# Patient Record
Sex: FEMALE | NOT HISPANIC OR LATINO
[De-identification: names, ages, dates, MRNs, and addresses within clinical notes are randomized per-mention and may not be internally consistent; named-entity substitution may affect disease eponyms.]

---
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
## 2019-06-15 ENCOUNTER — WALK IN (OUTPATIENT)
Dept: URGENT CARE | Age: 19
End: 2019-06-15

## 2019-06-15 DIAGNOSIS — J02.9 PHARYNGITIS, UNSPECIFIED ETIOLOGY: Primary | ICD-10-CM

## 2019-06-15 DIAGNOSIS — J30.2 SEASONAL ALLERGIES: ICD-10-CM

## 2019-06-15 LAB
INTERNAL PROCEDURAL CONTROLS ACCEPTABLE: YES
S PYO AG THROAT QL IA.RAPID: NEGATIVE

## 2019-06-15 PROCEDURE — 87147 CULTURE TYPE IMMUNOLOGIC: CPT | Performed by: NURSE PRACTITIONER

## 2019-06-15 PROCEDURE — 87880 STREP A ASSAY W/OPTIC: CPT | Performed by: NURSE PRACTITIONER

## 2019-06-15 PROCEDURE — 99213 OFFICE O/P EST LOW 20 MIN: CPT | Performed by: NURSE PRACTITIONER

## 2019-06-15 PROCEDURE — 87070 CULTURE OTHR SPECIMN AEROBIC: CPT | Performed by: NURSE PRACTITIONER

## 2019-06-15 RX ORDER — TOPIRAMATE 100 MG/1
100 TABLET, FILM COATED ORAL
COMMUNITY

## 2019-06-15 RX ORDER — ALBUTEROL SULFATE 90 UG/1
AEROSOL, METERED RESPIRATORY (INHALATION)
COMMUNITY

## 2019-06-15 RX ORDER — ALBUTEROL SULFATE 2.5 MG/3ML
SOLUTION RESPIRATORY (INHALATION)
COMMUNITY

## 2019-06-15 ASSESSMENT — ENCOUNTER SYMPTOMS
GASTROINTESTINAL NEGATIVE: 1
APPETITE CHANGE: 0
TROUBLE SWALLOWING: 0
NEUROLOGICAL NEGATIVE: 1
ACTIVITY CHANGE: 0
FEVER: 0
FATIGUE: 0
CHILLS: 1
RHINORRHEA: 0
RESPIRATORY NEGATIVE: 1
DIAPHORESIS: 0
EYES NEGATIVE: 1
SINUS PAIN: 0
ADENOPATHY: 1
SINUS PRESSURE: 0
VOICE CHANGE: 1
SORE THROAT: 1

## 2019-06-15 ASSESSMENT — PAIN SCALES - GENERAL: PAINLEVEL: 7-8

## 2019-06-17 LAB
BACTERIA SPEC RESP CULT: ABNORMAL
REPORT STATUS (RPT): ABNORMAL
SPECIMEN SOURCE: ABNORMAL

## 2019-06-17 RX ORDER — PENICILLIN V POTASSIUM 500 MG/1
500 TABLET ORAL 2 TIMES DAILY
Qty: 20 TABLET | Refills: 0 | Status: SHIPPED | OUTPATIENT
Start: 2019-06-17 | End: 2019-06-27

## 2021-05-26 VITALS
BODY MASS INDEX: 51.38 KG/M2 | DIASTOLIC BLOOD PRESSURE: 82 MMHG | SYSTOLIC BLOOD PRESSURE: 122 MMHG | WEIGHT: 290 LBS | RESPIRATION RATE: 20 BRPM | HEART RATE: 76 BPM | HEIGHT: 63 IN | TEMPERATURE: 97.6 F | OXYGEN SATURATION: 98 %

## 2022-05-24 ENCOUNTER — APPOINTMENT (OUTPATIENT)
Dept: OTHER | Facility: HOSPITAL | Age: 22
End: 2022-05-24
Attending: PREVENTIVE MEDICINE

## 2022-05-26 ENCOUNTER — APPOINTMENT (OUTPATIENT)
Dept: OTHER | Facility: HOSPITAL | Age: 22
End: 2022-05-26
Attending: PREVENTIVE MEDICINE

## 2022-05-27 ENCOUNTER — TELEPHONE (OUTPATIENT)
Dept: SCHEDULING | Age: 22
End: 2022-05-27

## 2022-05-28 ENCOUNTER — OFFICE VISIT (OUTPATIENT)
Dept: URGENT CARE | Age: 22
End: 2022-05-28

## 2022-05-28 VITALS
TEMPERATURE: 98.2 F | BODY MASS INDEX: 51.67 KG/M2 | RESPIRATION RATE: 18 BRPM | OXYGEN SATURATION: 98 % | DIASTOLIC BLOOD PRESSURE: 70 MMHG | SYSTOLIC BLOOD PRESSURE: 116 MMHG | WEIGHT: 280.76 LBS | HEART RATE: 88 BPM | HEIGHT: 62 IN

## 2022-05-28 DIAGNOSIS — Z23 NEED FOR VACCINATION: ICD-10-CM

## 2022-05-28 DIAGNOSIS — Z00.00 WELLNESS EXAMINATION: ICD-10-CM

## 2022-05-28 DIAGNOSIS — Z02.1 PRE-EMPLOYMENT EXAMINATION: Primary | ICD-10-CM

## 2022-05-28 PROCEDURE — 99211 OFF/OP EST MAY X REQ PHY/QHP: CPT | Performed by: NURSE PRACTITIONER

## 2022-05-28 PROCEDURE — X0945 SELF PAY APN OR PA PERFORMED ADMINISTRATIVE PHYSICAL: HCPCS | Performed by: NURSE PRACTITIONER

## 2022-05-28 PROCEDURE — 90715 TDAP VACCINE 7 YRS/> IM: CPT | Performed by: NURSE PRACTITIONER

## 2022-05-28 RX ORDER — ALBUTEROL SULFATE 90 UG/1
2 AEROSOL, METERED RESPIRATORY (INHALATION)
COMMUNITY
Start: 2022-04-25

## 2022-05-28 RX ORDER — DEXTROAMPHETAMINE SACCHARATE, AMPHETAMINE ASPARTATE MONOHYDRATE, DEXTROAMPHETAMINE SULFATE AND AMPHETAMINE SULFATE 7.5; 7.5; 7.5; 7.5 MG/1; MG/1; MG/1; MG/1
30 CAPSULE, EXTENDED RELEASE ORAL EVERY MORNING
COMMUNITY
Start: 2022-04-29

## 2022-05-28 ASSESSMENT — ENCOUNTER SYMPTOMS
EYES NEGATIVE: 1
ENDOCRINE NEGATIVE: 1
NEUROLOGICAL NEGATIVE: 1
HEMATOLOGIC/LYMPHATIC NEGATIVE: 1
GASTROINTESTINAL NEGATIVE: 1
PSYCHIATRIC NEGATIVE: 1
ALLERGIC/IMMUNOLOGIC NEGATIVE: 1
CONSTITUTIONAL NEGATIVE: 1
RESPIRATORY NEGATIVE: 1

## 2022-06-01 ENCOUNTER — TELEPHONE (OUTPATIENT)
Dept: SCHEDULING | Age: 22
End: 2022-06-01

## 2022-09-29 ENCOUNTER — APPOINTMENT (OUTPATIENT)
Dept: URGENT CARE | Age: 22
End: 2022-09-29

## 2025-05-15 NOTE — PROGRESS NOTES
Outpatient Maternal-Fetal Medicine Consultation    Dear Dr. Max,    Thank you for requesting ultrasound evaluation and maternal fetal medicine consultation on your patient Ela Kinney.  As you are aware she is a 24 year old female with a Tang pregnancy at 20w0d.  A maternal-fetal medicine consultation was requested secondary to class III obesity.  Her prenatal records and labs were reviewed.    Prior to this pregnancy she has been successful with weight loss.  She has been on Mounjaro from 2024 till the first week of 2025 when she figured out she was pregnant.  Since stopping the Mounjaro she reports she has gained 14 pounds.    She takes Adderall extended release 30 mg daily for ADHD.  She has been on this since she was in the second grade.  In the past she has tried to go down to 15 mg a day but did find that her symptoms were not well-controlled and she had increased weight gain due to her increased appetite.    We discussed as with any medication, there are risks and benefits that need to be determined.  My usual recommendation is to be on the lowest dose needed to control symptoms.  She is going to discuss with her primary care provider whether there is a dose between 15 mg and 30 mg that she can try.      HISTORY  OB History    Para Term  AB Living   1 0 0 0 0 0   SAB IAB Ectopic Multiple Live Births   0 0 0 0 0     # 1 - Date: None, Sex: None, Weight: None, GA: None, Type: None, Apgar1: None, Apgar5: None, Living: None, Birth Comments: None    Past Medical History  The patient  has a past medical history of ADHD and Morbid obesity with BMI of 45.0-49.9, adult ([facility]).    Past Surgical History  The patient  has no past surgical history on file.    Family History  The patient has no family status information on file.       Medications: Medications - Current[1]  Allergies: Allergies[2]      PHYSICAL EXAMINATION:  /82 (BP Location: Right arm, Patient  Position: Sitting, Cuff Size: large)   Pulse 112   Ht 5' 2\" (1.575 m)   Wt 264 lb (119.7 kg)   LMP 2024   BMI 48.29 kg/m²   General: alert and oriented,no acute distress; obese female  Abdomen: gravid, soft, non-tender  Extremities: non-tender, no edema      OBSTETRIC ULTRASOUND  The patient had a level 2 ultrasound today which I interpreted the results and reviewed them with the patient.    Ultrasound Findings:  Single IUP in breech presentation.    Placenta is posterior.   A 3 vessel cord is noted.  Cardiac activity is present at 135 bpm   g ( 0 lb 13 oz);   MVP is 4 cm .     The RVOT and t 4 chamber views appear normal but suboptimal due to fetal position and maternal habitus.    The fetal measurements are consistent with the established EDC. No ultrasound evidence of structural abnormalities are seen today. The nasal bone is present. No ultrasound evidence of markers for aneuploidy are seen. She understands that ultrasound exam cannot exclude genetic abnormalities and that genetic testing is recommended. The limitations of ultrasound were discussed.     Uterus and adnexa appeared normal  today on US    See imaging tab for the complete US report.    DISCUSSION  During her visit we discussed and reviewed the following issues:  OBESITY:  Her BMI prior to pregnancy was 48  Obesity during pregnancy is associated with numerous maternal and  risks.  It is not clear whether obesity is a direct cause of adverse pregnancy outcome or whether the association between obesity and adverse pregnancy outcome is due to factors such as diabetes mellitus.   Data suggest that obese women should be encouraged to undertake a weight reduction program (diet, exercise, behavior modification, and possibly bariatric surgery in some cases) prior to attempting to conceive.            Subfertility in obese women is most commonly related to ovulatory dysfunction, and, in some obese women, the ovulatory dysfunction is  related to polycystic ovary syndrome (PCOS). It is also important to note that even among ovulatory women, increasing obesity is associated with decreasing spontaneous pregnancy rates.  The increased risk of miscarriage in obese women may be because such women often have PCOS or isolated insulin resistance.                 Due to its strong association with obesity in the general population, type 2 diabetes mellitus is one of the two most common medical complications of the obese . The increased risk of type 2 diabetes is primarily related to an exaggerated increase in insulin resistance in the obese state. It is reasonable to screen obese gravidas for undiagnosed pregestational diabetes in the first trimester.   Glucose intolerance associated with gestational diabetes generally resolves postpartum; however, obese women with a history of gestational diabetes have a two-fold increased prevalence of subsequent type 2 diabetes.           An association between obesity and hypertensive disorders during pregnancy has been consistently reported.  In particular, maternal weight and BMI are independent risk factors for preeclampsia.             Studies have found that the increased risk of  birth in obese gravidas is primarily associated with obesity-related medical and  complications, rather than an intrinsic predisposition to spontaneous  birth. Prevention of  birth in these patients, therefore, should be directed toward prevention or management of medical and obstetrical complications.               Both prepregnancy obesity and excessive maternal weight gain before or during pregnancy contribute to an increased probability of  delivery.  It has also been hypothesized that obesity may lead to dystocia due to increased soft tissue deposition in the maternal pelvis.    delivery in the obese  is associated with numerous perioperative concerns, including emergency  delivery, prolonged incision to delivery interval, blood loss >1000 mL, longer operative times, wound infection, thromboembolism, and endometritis.            Maternal obesity appears to be associated with a small increase in the absolute rate of some congenital anomalies (primarily neural tube defect and cardiac), and the risk may increase with increasing maternal weight.  Level II ultrasound is advised for women with obesity.  The risk of neural tube defects increased significantly with maternal weight.    The analysis found that overweight and obese pregnant women experienced significantly more stillbirths than normal weight women.  Third trimester  testing is advised.    We discussed the current recommendations for limited gestational weight gain in pregnancy for overweight and obese women.  The [employer] of [employer] currently recommends that women keep gestational weight gain to between 8-18 lbs.  We discussed the role of mild to moderate exercise, healthy food choices and appropriate portions sized to help achieve this goal.  Excess weight gain is associated with higher rates of gestational diabetes, hypertensive complications, fetal macrosomia and delivery complications.  Women with weight loss or insufficient weight gain have higher rates of small for gestational age infants.    A recent study found that initiating moderate exercise in early pregnancy for obese gravidas significantly reduced the incidence of gestational diabetes, gestational hypertension,  deliveries and C-sections..  I encouraged Ela to continue and even increase moderate exercise such as walking or stationary bike in the pregnancy.    GLP1-agonists From UpToDate (3/2025)      Glucagon-like peptide-1 (GLP-1) receptor agonists are not recommended for patients with type 2 diabetes mellitus planning to become pregnant. Patients who could become pregnant should use effective contraception during therapy. Transition to a  preferred therapy should be initiated prior to conception and contraception should be continued until glycemic control is achieved (ADA 2024; Alexopoulos 2019; [place] 2020)    When used for the treatment of diabetes mellitus or weight loss management, semaglutide should be discontinued for >=2 months prior to becoming pregnant.    GLP-1 receptor agonists, including semaglutide, have been evaluated to manage metabolic aspects of polycystic ovary syndrome (PCOS), including decreasing weight and improving fertility (Goldberg 2024; Aaron 202). Until additional data are available, use in patients with PCOS to improve fertility is considered experimental (Teede 2018).    Pregnancy Considerations:     Outcome data following exposure to glucagon-like peptide-1 receptor agonists, including semaglutide, during pregnancy are limited (Cesta 202; Octavio 202; Prieto 202; Skov ).    Poorly controlled diabetes during pregnancy is associated with an increased risk of adverse maternal and fetal outcomes, including diabetic ketoacidosis, preeclampsia, spontaneous ,  delivery, delivery complications, major malformations, stillbirth, and macrosomia (ACOG 2018). To prevent adverse outcomes, prior to conception and throughout pregnancy, maternal blood glucose and HbA1c should be kept as close to target goals as possible but without causing significant hypoglycemia (ADA 2024).    Agents other than semaglutide are currently recommended to treat diabetes mellitus during pregnancy (ADA 2024).    An increased risk of adverse maternal and fetal events is associated with obesity; however, moderate gestational weight gain based on prepregnancy BMI is required for positive fetal outcomes in all pregnancies, including patients who are overweight or obese. Therefore, medications for weight loss therapy are not recommended during pregnancy (ACOG 2021; Greg 2020).    Data collection to monitor pregnancy and infant outcomes  following exposure to GLP-1 is ongoing. Health care providers are encouraged to enroll patients exposed to GLP-1 during pregnancy in the registry by contacting [email or web address]). Patients may also enroll themselves.    Adderall is a category C medication in pregnancy.  Adverse effects have been observed in animal reproduction studies. The majority of human data is based on illicit amphetamine/methamphetamine exposure and not from therapeutic maternal use (Pete, 2005). Use of amphetamines during pregnancy may lead to an increased risk of premature birth and low birth weight; newborns may experience symptoms of withdrawal. Behavioral problems may also occur later in childhood (Nohelia, 2012).  Adderall enters the breast milk and breastfeeding is not recommended for women taking this medication.      We discussed the recommended plan of care based on her  risk factors.  Ela and her mother had their questions answered to their satisfaction.      IMPRESSION:  IUP at 20w0d  Normal level 2 ultrasound but suboptimal views of the 4 chamber heart and RVOT, although the views do appear normal  Class III obesity complicating pregnancy  ADHD  Medication exposures    RECOMMENDATIONS:  Continue care with Dr. Max  Was advised to limit total weight gain to less than 20 pounds  Monthly growth ultrasounds starting at 28 weeks, add BPP to each growth ultrasound at 32 weeks and beyond  Weekly NSTs at 34 weeks  Delivery 39-40 weeks  Daily low-dose aspirin    Total time spent 60 minutes this calendar day which includes preparing to see the patient including chart review, obtaining and/or reviewing additional medical history, performing a physical exam and evaluation, documenting clinical information in the electronic medical record, independently interpreting results, counseling the patient, communicating results to the patient/family/caregiver and coordinating care.     Case discussed with  patient who demonstrated understanding and agreement with plan.     Thank you for allowing me to participate in the care of this patient.  Please feel free to contact me with any questions.    Anne Monique MD  Maternal-Fetal Medicine       Note to patient and family:  The 21st Century Cures Act makes medical notes available to patients in the interest of transparency.  However, please be advised that this is a medical document.  It is intended as a peer to peer communication.  It is written in medical language and may contain abbreviations or verbiage that are technical and unfamiliar.  It may appear blunt or direct.  Medical documents are intended to carry relevant information, facts as evident, and the clinical opinion of the practitioner.         [1]   Current Outpatient Medications:     prenatal vitamin with DHA 27-0.8-228 MG Oral Cap, Take 1 capsule by mouth daily., Disp: , Rfl:     amphetamine-dextroamphetamine 30 MG Oral Tab, Take by mouth daily., Disp: , Rfl:     aspirin 81 MG Oral Tab EC, Take 2 tablets (162 mg total) by mouth daily., Disp: , Rfl:   [2] Not on File

## 2025-07-11 NOTE — PROGRESS NOTES
Pt here for growth US, +FM. States she has some anxiety recently and increased HR because of it.

## 2025-07-11 NOTE — PROGRESS NOTES
Outpatient Maternal-Fetal Medicine Consultation    Dear Dr. Max,    Thank you for requesting ultrasound evaluation and maternal fetal medicine consultation on your patient Ela Kinney.  As you are aware she is a 24 year old female with a Tang pregnancy with Estimated Date of Delivery: 10/3/25 .  A maternal-fetal medicine follow up is today. Her prenatal records and labs were reviewed.    Patient is adopted.  She is here with her adoptive mother that she has lived with since she was 2 days old.  Her mother stated she has been on Adderall since second grade.  In addition there is a history of a pituitary adenoma that was seen on MRI as a child but then was never seen on follow-up imaging.    She is planning her 28-week labs this next week.    She is worried about her anxiety level.  She had a panic attack 3 weeks ago and even passed out during it.  She is having palpitations every few days.  It tends to happen more at night.  When she has palpitations, her heart rate may be 110-115.  However there has been other times where her heart rate is in the 140s.  She has a watch that can track her heart rate.  She feels like she is having anxiety that is leading to this.  She has a history of having some anxiety but was more situational in nature.  Only time she has ever been on anxiety was during COVID.    She feels she has been more anxious since she has been pregnant.  She is concerned about weight gain in the pregnancy as she had lost approximately 100 pounds prior to the pregnancy.    She has decreased her Adderall to 15 mg daily and is tolerating it well.  She says she feels fine.  She has not noticed side effects of the reduced dose during the pregnancy.  She asked if she should continue to change this or is there a time point at which point she would change it.    Prior visit.    Prior to this pregnancy she has been successful with weight loss.  She has been on Mounjaro from September 1, 2024 till  the first week of 2025 when she figured out she was pregnant.  Since stopping the Mounjaro she reports she has gained 14 pounds.    She takes Adderall extended release 30 mg daily for ADHD.  She has been on this since she was in the second grade.  In the past she has tried to go down to 15 mg a day but did find that her symptoms were not well-controlled and she had increased weight gain due to her increased appetite.    We discussed as with any medication, there are risks and benefits that need to be determined.  My usual recommendation is to be on the lowest dose needed to control symptoms.  She is going to discuss with her primary care provider whether there is a dose between 15 mg and 30 mg that she can try.      HISTORY  OB History    Para Term  AB Living   1 0 0 0 0 0   SAB IAB Ectopic Multiple Live Births   0 0 0 0 0     # 1 - Date: None, Sex: None, Weight: None, GA: None, Type: None, Apgar1: None, Apgar5: None, Living: None, Birth Comments: None    Past Medical History  The patient  has a past medical history of ADHD and Morbid obesity with BMI of 45.0-49.9, adult (Formerly [facility]).    Past Surgical History  The patient  has no past surgical history on file.    Family History  The patient has no family status information on file.       Medications: Medications - Current[1]  Allergies: Allergies[2]      PHYSICAL EXAMINATION:  /77 (BP Location: Right arm, Patient Position: Sitting, Cuff Size: large)   Pulse 97   Ht 5' 2\" (1.575 m)   Wt 278 lb (126.1 kg)   LMP 2024   BMI 50.85 kg/m²   General: alert and oriented,no acute distress; obese female  Abdomen: gravid, soft, non-tender        OBSTETRIC ULTRASOUND  The patient had a follow up ultrasound today which I interpreted the results and reviewed them with the patient.  Ultrasound Findings:    Single IUP in breech presentation.    Placenta is posterior.   Cardiac activity is present at 142 bpm  EFW 1388 g ( 3 lb 1 oz); 73%.    MVP is  7.2 cm . JOSE 16.6 cm  RVOT and 4 chamber view appear normal.  The fetal measurements are consistent with established NATHANAEL. No gross ultrasound evidence of structural abnormalities are seen today. The patient understands that ultrasound cannot rule out all structural and chromosomal abnormalities.     See imaging tab for the complete US report.    DISCUSSION  During her visit we discussed and reviewed the following issues:  OBESITY:  Her BMI prior to pregnancy was 48 Please see previous MFM detailed discussion.   GLP1-agonists From UpToDate (3/2025)      Glucagon-like peptide-1 (GLP-1) receptor agonists are not recommended for patients with type 2 diabetes mellitus planning to become pregnant. Patients who could become pregnant should use effective contraception during therapy. Transition to a preferred therapy should be initiated prior to conception and contraception should be continued until glycemic control is achieved (ADA 2024; Alexopoulos 2019; Neo 2020)    When used for the treatment of diabetes mellitus or weight loss management, semaglutide should be discontinued for >=2 months prior to becoming pregnant.    GLP-1 receptor agonists, including semaglutide, have been evaluated to manage metabolic aspects of polycystic ovary syndrome (PCOS), including decreasing weight and improving fertility (Goldberg 202; Aaron 202). Until additional data are available, use in patients with PCOS to improve fertility is considered experimental (Shaqde 2018).    Pregnancy Considerations:     Outcome data following exposure to glucagon-like peptide-1 receptor agonists, including semaglutide, during pregnancy are limited (Cesta 202; Octavio 202; Prieto 202; Skov ).    Poorly controlled diabetes during pregnancy is associated with an increased risk of adverse maternal and fetal outcomes, including diabetic ketoacidosis, preeclampsia, spontaneous ,  delivery, delivery complications, major malformations,  stillbirth, and macrosomia (ACOG 2018). To prevent adverse outcomes, prior to conception and throughout pregnancy, maternal blood glucose and HbA1c should be kept as close to target goals as possible but without causing significant hypoglycemia (ADA 2024).    Agents other than semaglutide are currently recommended to treat diabetes mellitus during pregnancy (ADA 2024).    An increased risk of adverse maternal and fetal events is associated with obesity; however, moderate gestational weight gain based on prepregnancy BMI is required for positive fetal outcomes in all pregnancies, including patients who are overweight or obese. Therefore, medications for weight loss therapy are not recommended during pregnancy (ACOG 2021; Greg 2020).    Data collection to monitor pregnancy and infant outcomes following exposure to GLP-1 is ongoing. Health care providers are encouraged to enroll patients exposed to GLP-1 during pregnancy in the registry by contacting https://[email or web address].Green A). Patients may also enroll themselves.    Adderall is a category C medication in pregnancy.  Adverse effects have been observed in animal reproduction studies. The majority of human data is based on illicit amphetamine/methamphetamine exposure and not from therapeutic maternal use (Pete, 2005). Use of amphetamines during pregnancy may lead to an increased risk of premature birth and low birth weight; newborns may experience symptoms of withdrawal. Behavioral problems may also occur later in childhood (Nohelia, 2012).  Adderall enters the breast milk and breastfeeding is not recommended for women taking this medication.    I recommended that she have a cardiac consultation due to the history of the palpitations.  While it may be anxiety, we would want to rule out a cardiac source.    She has an appointment with neurology which is reasonable to keep.    From an Adderall standpoint, she can try minimizing the use of Adderall if  able.  For example some women will not take it on the weekends or on days that there can be less attention to detail.    She asked about breast-feeding.  Breast-feeding was briefly discussed.  However if she is on Adderall, then she may not be able to breast-feed.  So as far as her goals for weight loss after pregnancy, I would recommend thinking about if she is able to come off Adderall.  If she is not able to come off the Adderall, then it may be that she takes her Adderall and restarts her weight loss journey and bottle feeds her baby.      No results found for: \"GLUCOSE 1HR OB\"             We discussed what is known about the safety of selective serotonin receptor inhibitors (SSRI) in pregnancy.    An increased risk of teratogenic effects, including cardiovascular defects, may be associated with maternal use of sertraline or other SSRIs.   Nonteratogenic effects that may been seen in the  period include respiratory distress, cyanosis, apnea, siezures, temperature instability, feeding difficulty, vomiting, hypoglycemia, increased or decreased tone, irritability, crying, hyper-reflexia, and jitteriness or tremor.   Exposure to SSRIs late in pregnancy has also been associated with persistent pulmonary hypertension of the .   Adverse effects may be due to toxic effects of the SSRI or drug withdrawal due to discontinuation. The long-term effects of in utero SSRI exposure on infant development and behavior are not known.           Due to pregnancy-induced physiologic changes, women who are pregnant may require increased doses of paroxetine to achieve euthymia. Women treated for major depression and who are euthymic prior to pregnancy are more likely to experience a relapse when medication is discontinued as compared to pregnant women who continue taking antidepressant medications. The ACOG recommends that therapy with SSRIs or SNRIs during pregnancy be individualized; treatment of depression during  pregnancy should incorporate the clinical expertise of the mental health clinician, obstetrician, primary healthcare provider, and pediatrician (ACOG, 2007). The ACOG also recommend that therapy with paroxetine(Paxil) be avoided during pregnancy if possible and that fetuses exposed in early pregnancy be assessed with a fetal echocardiography.             If treatment during pregnancy is required, tapering therapy is not advised during the third trimester despite the potential for withdrawal symptoms in the infant. Other evidence supports that inadequately treated maternal mood disorders has other effects on  behavior that can be related to impaired mother-infant bonding from inadequately treated depression/anxiety. Although this outcome is more difficult to measure, the repercussions can be significant. In addition, even with immediate use of SSRI's after delviery, given their prolonged half-life, clinical effectiveness can take several weeks. This exposes the patient to potentially significant risks of exacerbation of her mood disorder at a particularly vulnerable time. This further emphasizes the need for an individualized approach to care.       We discussed the recommended plan of care based on her  risk factors.  Ela and her mother had their questions answered to their satisfaction.      IMPRESSION:  IUP at 28w0d   Normal level 2 ultrasound but suboptimal views of the 4 chamber heart and RVOT, --> views completed today.    Class III obesity complicating pregnancy  ADHD  Medication exposures  Recurrent palpitations and single episode of syncope  Anxiety in pregnancy    RECOMMENDATIONS:  Continue care with Dr. Max  Was advised to limit total weight gain to less than 20 pounds  Monthly growth ultrasounds starting at 28 weeks, add BPP to each growth ultrasound at 32 weeks and beyond  Weekly NSTs at 34 weeks  Delivery 39-40 weeks  Daily low-dose aspirin  I recommended she eat 15-30g  carbs for breakfast.  30g carb mid morning  and mid-afternoon snack if hungry.  30 g carbs for lunch and dinner.  The bedtime snack, if hungry, should be 15g carbs with some protein.  Snack can be between 8-10 pm.    Recommend cardiology consultation for palpitations and single episode of syncope  Recommend behavioral cognitive therapy.    If indicated, SSRI can be used.  Keep neurology follow-up appointment.  Continue exercising      Total time spent 50 minutes this calendar day which includes preparing to see the patient including chart review, obtaining and/or reviewing additional medical history, performing a physical exam and evaluation, documenting clinical information in the electronic medical record, independently interpreting results, counseling the patient, communicating results to the patient/family/caregiver and coordinating care.     Case discussed with patient who demonstrated understanding and agreement with plan.     Thank you for allowing me to participate in the care of this patient.  Please feel free to contact me with any questions.    Maribel Cowan MD   Maternal-Fetal Medicine       Note to patient and family:  The 21st Century Cures Act makes medical notes available to patients in the interest of transparency.  However, please be advised that this is a medical document.  It is intended as a peer to peer communication.  It is written in medical language and may contain abbreviations or verbiage that are technical and unfamiliar.  It may appear blunt or direct.  Medical documents are intended to carry relevant information, facts as evident, and the clinical opinion of the practitioner.         [1]   Current Outpatient Medications:     prenatal vitamin with DHA 27-0.8-228 MG Oral Cap, Take 1 capsule by mouth daily., Disp: , Rfl:     amphetamine-dextroamphetamine 30 MG Oral Tab, Take by mouth daily., Disp: , Rfl:     aspirin 81 MG Oral Tab EC, Take 2 tablets (162 mg total) by mouth daily., Disp: ,  Rfl:   [2] Not on File

## (undated) NOTE — LETTER
May 16, 2025      Moriah Max MD  [address]  [address]  [facility] [address]  Via Fax: [phone]  Patient: Ela Kinney  : 2000    Dear Colleague:  Thank you for referring your patient to me for a Maternal Fetal Medicine evaluation. Please see my attached note for my findings and recommendations.      Should you have any questions or concerns, please do not hesitate to contact me at the number listed below.    Best Regards,      Anne Monique MD  [facility]   [address]  [facility] [id number] [phone]    cc: No Recipients      [facility] Department of Maternal Fetal Medicine  Patient Name: Ela Kinney  Patient : 2000  Physician: Anne Monique MD    Outpatient Maternal-Fetal Medicine Consultation    Dear Dr. Max,    Thank you for requesting ultrasound evaluation and maternal fetal medicine consultation on your patient Ela Kinney.  As you are aware she is a 24 year old female with a Tang pregnancy at 20w0d.  A maternal-fetal medicine consultation was requested secondary to class III obesity.  Her prenatal records and labs were reviewed.    Prior to this pregnancy she has been successful with weight loss.  She has been on Mounjaro from 2024 till the first week of 2025 when she figured out she was pregnant.  Since stopping the Mounjaro she reports she has gained 14 pounds.    She takes Adderall extended release 30 mg daily for ADHD.  She has been on this since she was in the second grade.  In the past she has tried to go down to 15 mg a day but did find that her symptoms were not well-controlled and she had increased weight gain due to her increased appetite.    We discussed as with any medication, there are risks and benefits that need to be determined.  My usual recommendation is to be on the lowest dose needed to control symptoms.  She is going to discuss with her primary care provider whether  there is a dose between 15 mg and 30 mg that she can try.      HISTORY  OB History    Para Term  AB Living   1 0 0 0 0 0   SAB IAB Ectopic Multiple Live Births   0 0 0 0 0     # 1 - Date: None, Sex: None, Weight: None, GA: None, Type: None, Apgar1: None, Apgar5: None, Living: None, Birth Comments: None    Past Medical History  The patient  has a past medical history of ADHD and Morbid obesity with BMI of 45.0-49.9, adult ([facility]).    Past Surgical History  The patient  has no past surgical history on file.    Family History  The patient has no family status information on file.       Medications: Medications - Current[1]  Allergies: Allergies[2]      PHYSICAL EXAMINATION:  /82 (BP Location: Right arm, Patient Position: Sitting, Cuff Size: large)   Pulse 112   Ht 5' 2\" (1.575 m)   Wt 264 lb (119.7 kg)   LMP 2024   BMI 48.29 kg/m²   General: alert and oriented,no acute distress; obese female  Abdomen: gravid, soft, non-tender  Extremities: non-tender, no edema      OBSTETRIC ULTRASOUND  The patient had a level 2 ultrasound today which I interpreted the results and reviewed them with the patient.    Ultrasound Findings:  Single IUP in breech presentation.    Placenta is posterior.   A 3 vessel cord is noted.  Cardiac activity is present at 135 bpm   g ( 0 lb 13 oz);   MVP is 4 cm .     The RVOT and t 4 chamber views appear normal but suboptimal due to fetal position and maternal habitus.    The fetal measurements are consistent with the established EDC. No ultrasound evidence of structural abnormalities are seen today. The nasal bone is present. No ultrasound evidence of markers for aneuploidy are seen. She understands that ultrasound exam cannot exclude genetic abnormalities and that genetic testing is recommended. The limitations of ultrasound were discussed.     Uterus and adnexa appeared normal  today on US    See imaging tab for the complete US report.    DISCUSSION  During her  visit we discussed and reviewed the following issues:  OBESITY:  Her BMI prior to pregnancy was 48  Obesity during pregnancy is associated with numerous maternal and  risks.  It is not clear whether obesity is a direct cause of adverse pregnancy outcome or whether the association between obesity and adverse pregnancy outcome is due to factors such as diabetes mellitus.   Data suggest that obese women should be encouraged to undertake a weight reduction program (diet, exercise, behavior modification, and possibly bariatric surgery in some cases) prior to attempting to conceive.            Subfertility in obese women is most commonly related to ovulatory dysfunction, and, in some obese women, the ovulatory dysfunction is related to polycystic ovary syndrome (PCOS). It is also important to note that even among ovulatory women, increasing obesity is associated with decreasing spontaneous pregnancy rates.  The increased risk of miscarriage in obese women may be because such women often have PCOS or isolated insulin resistance.                 Due to its strong association with obesity in the general population, type 2 diabetes mellitus is one of the two most common medical complications of the obese . The increased risk of type 2 diabetes is primarily related to an exaggerated increase in insulin resistance in the obese state. It is reasonable to screen obese gravidas for undiagnosed pregestational diabetes in the first trimester.   Glucose intolerance associated with gestational diabetes generally resolves postpartum; however, obese women with a history of gestational diabetes have a two-fold increased prevalence of subsequent type 2 diabetes.           An association between obesity and hypertensive disorders during pregnancy has been consistently reported.  In particular, maternal weight and BMI are independent risk factors for preeclampsia.             Studies have found that the increased risk of   birth in obese gravidas is primarily associated with obesity-related medical and  complications, rather than an intrinsic predisposition to spontaneous  birth. Prevention of  birth in these patients, therefore, should be directed toward prevention or management of medical and obstetrical complications.               Both prepregnancy obesity and excessive maternal weight gain before or during pregnancy contribute to an increased probability of  delivery.  It has also been hypothesized that obesity may lead to dystocia due to increased soft tissue deposition in the maternal pelvis.    delivery in the obese  is associated with numerous perioperative concerns, including emergency delivery, prolonged incision to delivery interval, blood loss >1000 mL, longer operative times, wound infection, thromboembolism, and endometritis.            Maternal obesity appears to be associated with a small increase in the absolute rate of some congenital anomalies (primarily neural tube defect and cardiac), and the risk may increase with increasing maternal weight.  Level II ultrasound is advised for women with obesity.  The risk of neural tube defects increased significantly with maternal weight.    The analysis found that overweight and obese pregnant women experienced significantly more stillbirths than normal weight women.  Third trimester  testing is advised.    We discussed the current recommendations for limited gestational weight gain in pregnancy for overweight and obese women.  The [place] of [employer] currently recommends that women keep gestational weight gain to between 8-18 lbs.  We discussed the role of mild to moderate exercise, healthy food choices and appropriate portions sized to help achieve this goal.  Excess weight gain is associated with higher rates of gestational diabetes, hypertensive complications, fetal macrosomia and delivery complications.   Women with weight loss or insufficient weight gain have higher rates of small for gestational age infants.    A recent study found that initiating moderate exercise in early pregnancy for obese gravidas significantly reduced the incidence of gestational diabetes, gestational hypertension,  deliveries and C-sections..  I encouraged Ela to continue and even increase moderate exercise such as walking or stationary bike in the pregnancy.    GLP1-agonists From UpToDate (3/2025)      Glucagon-like peptide-1 (GLP-1) receptor agonists are not recommended for patients with type 2 diabetes mellitus planning to become pregnant. Patients who could become pregnant should use effective contraception during therapy. Transition to a preferred therapy should be initiated prior to conception and contraception should be continued until glycemic control is achieved (ADA 2024; Alexopoulos 2019; [place] 2020)    When used for the treatment of diabetes mellitus or weight loss management, semaglutide should be discontinued for >=2 months prior to becoming pregnant.    GLP-1 receptor agonists, including semaglutide, have been evaluated to manage metabolic aspects of polycystic ovary syndrome (PCOS), including decreasing weight and improving fertility (Goldberg 202; Aaron 202). Until additional data are available, use in patients with PCOS to improve fertility is considered experimental (Teede 2018).    Pregnancy Considerations:     Outcome data following exposure to glucagon-like peptide-1 receptor agonists, including semaglutide, during pregnancy are limited (Cesta 202; Octavio 202; Prieto 202; Skov ).    Poorly controlled diabetes during pregnancy is associated with an increased risk of adverse maternal and fetal outcomes, including diabetic ketoacidosis, preeclampsia, spontaneous ,  delivery, delivery complications, major malformations, stillbirth, and macrosomia (ACOG 2018). To prevent adverse outcomes,  prior to conception and throughout pregnancy, maternal blood glucose and HbA1c should be kept as close to target goals as possible but without causing significant hypoglycemia (ADA 2024).    Agents other than semaglutide are currently recommended to treat diabetes mellitus during pregnancy (ADA 2024).    An increased risk of adverse maternal and fetal events is associated with obesity; however, moderate gestational weight gain based on prepregnancy BMI is required for positive fetal outcomes in all pregnancies, including patients who are overweight or obese. Therefore, medications for weight loss therapy are not recommended during pregnancy (ACOG 2021; [place] 2020).    Data collection to monitor pregnancy and infant outcomes following exposure to GLP-1 is ongoing. Health care providers are encouraged to enroll patients exposed to GLP-1 during pregnancy in the registry by contacting [email or web address]). Patients may also enroll themselves.    Adderall is a category C medication in pregnancy.  Adverse effects have been observed in animal reproduction studies. The majority of human data is based on illicit amphetamine/methamphetamine exposure and not from therapeutic maternal use (Pete, 2005). Use of amphetamines during pregnancy may lead to an increased risk of premature birth and low birth weight; newborns may experience symptoms of withdrawal. Behavioral problems may also occur later in childhood (Nohelia, 2012).  Adderall enters the breast milk and breastfeeding is not recommended for women taking this medication.      We discussed the recommended plan of care based on her  risk factors.  Ela and her mother had their questions answered to their satisfaction.      IMPRESSION:  IUP at 20w0d  Normal level 2 ultrasound but suboptimal views of the 4 chamber heart and RVOT, although the views do appear normal  Class III obesity complicating pregnancy  ADHD  Medication  exposures    RECOMMENDATIONS:  Continue care with Dr. Max  Was advised to limit total weight gain to less than 20 pounds  Monthly growth ultrasounds starting at 28 weeks, add BPP to each growth ultrasound at 32 weeks and beyond  Weekly NSTs at 34 weeks  Delivery 39-40 weeks  Daily low-dose aspirin    Total time spent 60 minutes this calendar day which includes preparing to see the patient including chart review, obtaining and/or reviewing additional medical history, performing a physical exam and evaluation, documenting clinical information in the electronic medical record, independently interpreting results, counseling the patient, communicating results to the patient/family/caregiver and coordinating care.     Case discussed with patient who demonstrated understanding and agreement with plan.     Thank you for allowing me to participate in the care of this patient.  Please feel free to contact me with any questions.    Anne Monique MD  Maternal-Fetal Medicine       Note to patient and family:  The 21st Century Cures Act makes medical notes available to patients in the interest of transparency.  However, please be advised that this is a medical document.  It is intended as a peer to peer communication.  It is written in medical language and may contain abbreviations or verbiage that are technical and unfamiliar.  It may appear blunt or direct.  Medical documents are intended to carry relevant information, facts as evident, and the clinical opinion of the practitioner.         [1]   Current Outpatient Medications:     prenatal vitamin with DHA 27-0.8-228 MG Oral Cap, Take 1 capsule by mouth daily., Disp: , Rfl:     amphetamine-dextroamphetamine 30 MG Oral Tab, Take by mouth daily., Disp: , Rfl:     aspirin 81 MG Oral Tab EC, Take 2 tablets (162 mg total) by mouth daily., Disp: , Rfl:   [2] Not on File      Pt here for level II ultrasound/doctor consult  + FM  No complaints

## (undated) NOTE — LETTER
2025      Moriah Max MD  [address]  [address]  [facility] [address]  Via Fax: [phone]  Patient: Ela Kinney  : 2000    Dear Colleague:  Thank you for referring your patient to me for a Maternal Fetal Medicine evaluation. Please see my attached note for my findings and recommendations.      Should you have any questions or concerns, please do not hesitate to contact me at the number listed below.    Best Regards,      Maribel Cowan MD  [facility]   [address]  [facility] [id number] [phone]    cc: No Recipients      [facility] Department of Maternal Fetal Medicine  Patient Name: Ela Kinney  Patient : 2000  Physician: Maribel Cowan MD    Pt here for growth US, +FM. States she has some anxiety recently and increased HR because of it.       Outpatient Maternal-Fetal Medicine Consultation    Dear Dr. Max,    Thank you for requesting ultrasound evaluation and maternal fetal medicine consultation on your patient Ela Kinney.  As you are aware she is a 24 year old female with a Tang pregnancy with Estimated Date of Delivery: 10/3/25 .  A maternal-fetal medicine follow up is today. Her prenatal records and labs were reviewed.    Patient is adopted.  She is here with her adoptive mother that she has lived with since she was 2 days old.  Her mother stated she has been on Adderall since second grade.  In addition there is a history of a pituitary adenoma that was seen on MRI as a child but then was never seen on follow-up imaging.    She is planning her 28-week labs this next week.    She is worried about her anxiety level.  She had a panic attack 3 weeks ago and even passed out during it.  She is having palpitations every few days.  It tends to happen more at night.  When she has palpitations, her heart rate may be 110-115.  However there has been other times where her heart rate is in the 140s.  She has a watch that  can track her heart rate.  She feels like she is having anxiety that is leading to this.  She has a history of having some anxiety but was more situational in nature.  Only time she has ever been on anxiety was during COVID.    She feels she has been more anxious since she has been pregnant.  She is concerned about weight gain in the pregnancy as she had lost approximately 100 pounds prior to the pregnancy.    She has decreased her Adderall to 15 mg daily and is tolerating it well.  She says she feels fine.  She has not noticed side effects of the reduced dose during the pregnancy.  She asked if she should continue to change this or is there a time point at which point she would change it.    Prior visit.    Prior to this pregnancy she has been successful with weight loss.  She has been on Mounjaro from 2024 till the first week of 2025 when she figured out she was pregnant.  Since stopping the Mounjaro she reports she has gained 14 pounds.    She takes Adderall extended release 30 mg daily for ADHD.  She has been on this since she was in the second grade.  In the past she has tried to go down to 15 mg a day but did find that her symptoms were not well-controlled and she had increased weight gain due to her increased appetite.    We discussed as with any medication, there are risks and benefits that need to be determined.  My usual recommendation is to be on the lowest dose needed to control symptoms.  She is going to discuss with her primary care provider whether there is a dose between 15 mg and 30 mg that she can try.      HISTORY  OB History    Para Term  AB Living   1 0 0 0 0 0   SAB IAB Ectopic Multiple Live Births   0 0 0 0 0     # 1 - Date: None, Sex: None, Weight: None, GA: None, Type: None, Apgar1: None, Apgar5: None, Living: None, Birth Comments: None    Past Medical History  The patient  has a past medical history of ADHD and Morbid obesity with BMI of 45.0-49.9, adult  ([facility]).    Past Surgical History  The patient  has no past surgical history on file.    Family History  The patient has no family status information on file.       Medications: Medications - Current[1]  Allergies: Allergies[2]      PHYSICAL EXAMINATION:  /77 (BP Location: Right arm, Patient Position: Sitting, Cuff Size: large)   Pulse 97   Ht 5' 2\" (1.575 m)   Wt 278 lb (126.1 kg)   LMP 12/27/2024   BMI 50.85 kg/m²   General: alert and oriented,no acute distress; obese female  Abdomen: gravid, soft, non-tender        OBSTETRIC ULTRASOUND  The patient had a follow up ultrasound today which I interpreted the results and reviewed them with the patient.  Ultrasound Findings:    Single IUP in breech presentation.    Placenta is posterior.   Cardiac activity is present at 142 bpm  EFW 1388 g ( 3 lb 1 oz); 73%.    MVP is 7.2 cm . JOSE 16.6 cm  RVOT and 4 chamber view appear normal.  The fetal measurements are consistent with established NATHANAEL. No gross ultrasound evidence of structural abnormalities are seen today. The patient understands that ultrasound cannot rule out all structural and chromosomal abnormalities.     See imaging tab for the complete US report.    DISCUSSION  During her visit we discussed and reviewed the following issues:  OBESITY:  Her BMI prior to pregnancy was 48 Please see previous [facility] detailed discussion.   GLP1-agonists From UpToDate (3/2025)      Glucagon-like peptide-1 (GLP-1) receptor agonists are not recommended for patients with type 2 diabetes mellitus planning to become pregnant. Patients who could become pregnant should use effective contraception during therapy. Transition to a preferred therapy should be initiated prior to conception and contraception should be continued until glycemic control is achieved (ADA 2024; Alexopoulos 2019; Neo 2020)    When used for the treatment of diabetes mellitus or weight loss management, semaglutide should be discontinued for >=2 months prior to  becoming pregnant.    GLP-1 receptor agonists, including semaglutide, have been evaluated to manage metabolic aspects of polycystic ovary syndrome (PCOS), including decreasing weight and improving fertility (Goldberg 2024; Aaron 202). Until additional data are available, use in patients with PCOS to improve fertility is considered experimental (Teede 2018).    Pregnancy Considerations:     Outcome data following exposure to glucagon-like peptide-1 receptor agonists, including semaglutide, during pregnancy are limited (Cesta 202; Otcavio 202; Prieto 202; Skov ).    Poorly controlled diabetes during pregnancy is associated with an increased risk of adverse maternal and fetal outcomes, including diabetic ketoacidosis, preeclampsia, spontaneous ,  delivery, delivery complications, major malformations, stillbirth, and macrosomia (ACOG 2018). To prevent adverse outcomes, prior to conception and throughout pregnancy, maternal blood glucose and HbA1c should be kept as close to target goals as possible but without causing significant hypoglycemia (ADA 2024).    Agents other than semaglutide are currently recommended to treat diabetes mellitus during pregnancy (ADA 2024).    An increased risk of adverse maternal and fetal events is associated with obesity; however, moderate gestational weight gain based on prepregnancy BMI is required for positive fetal outcomes in all pregnancies, including patients who are overweight or obese. Therefore, medications for weight loss therapy are not recommended during pregnancy (ACOG 2021; Greg 2020).    Data collection to monitor pregnancy and infant outcomes following exposure to GLP-1 is ongoing. Health care providers are encouraged to enroll patients exposed to GLP-1 during pregnancy in the registry by contacting [email or web address]). Patients may also enroll themselves.    Adderall is a category C medication in pregnancy.  Adverse effects have  been observed in animal reproduction studies. The majority of human data is based on illicit amphetamine/methamphetamine exposure and not from therapeutic maternal use (Pete, 2005). Use of amphetamines during pregnancy may lead to an increased risk of premature birth and low birth weight; newborns may experience symptoms of withdrawal. Behavioral problems may also occur later in childhood (Nohelia, 2012).  Adderall enters the breast milk and breastfeeding is not recommended for women taking this medication.    I recommended that she have a cardiac consultation due to the history of the palpitations.  While it may be anxiety, we would want to rule out a cardiac source.    She has an appointment with neurology which is reasonable to keep.    From an Adderall standpoint, she can try minimizing the use of Adderall if able.  For example some women will not take it on the weekends or on days that there can be less attention to detail.    She asked about breast-feeding.  Breast-feeding was briefly discussed.  However if she is on Adderall, then she may not be able to breast-feed.  So as far as her goals for weight loss after pregnancy, I would recommend thinking about if she is able to come off Adderall.  If she is not able to come off the Adderall, then it may be that she takes her Adderall and restarts her weight loss journey and bottle feeds her baby.      No results found for: \"GLUCOSE 1HR OB\"             We discussed what is known about the safety of selective serotonin receptor inhibitors (SSRI) in pregnancy.    An increased risk of teratogenic effects, including cardiovascular defects, may be associated with maternal use of sertraline or other SSRIs.   Nonteratogenic effects that may been seen in the  period include respiratory distress, cyanosis, apnea, siezures, temperature instability, feeding difficulty, vomiting, hypoglycemia, increased or decreased tone, irritability, crying, hyper-reflexia, and  jitteriness or tremor.   Exposure to SSRIs late in pregnancy has also been associated with persistent pulmonary hypertension of the .   Adverse effects may be due to toxic effects of the SSRI or drug withdrawal due to discontinuation. The long-term effects of in utero SSRI exposure on infant development and behavior are not known.           Due to pregnancy-induced physiologic changes, women who are pregnant may require increased doses of paroxetine to achieve euthymia. Women treated for major depression and who are euthymic prior to pregnancy are more likely to experience a relapse when medication is discontinued as compared to pregnant women who continue taking antidepressant medications. The ACOG recommends that therapy with SSRIs or SNRIs during pregnancy be individualized; treatment of depression during pregnancy should incorporate the clinical expertise of the mental health clinician, obstetrician, primary healthcare provider, and pediatrician (ACOG, 2007). The ACOG also recommend that therapy with paroxetine(Paxil) be avoided during pregnancy if possible and that fetuses exposed in early pregnancy be assessed with a fetal echocardiography.             If treatment during pregnancy is required, tapering therapy is not advised during the third trimester despite the potential for withdrawal symptoms in the infant. Other evidence supports that inadequately treated maternal mood disorders has other effects on  behavior that can be related to impaired mother-infant bonding from inadequately treated depression/anxiety. Although this outcome is more difficult to measure, the repercussions can be significant. In addition, even with immediate use of SSRI's after delviery, given their prolonged half-life, clinical effectiveness can take several weeks. This exposes the patient to potentially significant risks of exacerbation of her mood disorder at a particularly vulnerable time. This further emphasizes  the need for an individualized approach to care.       We discussed the recommended plan of care based on her  risk factors.  Ela and her mother had their questions answered to their satisfaction.      IMPRESSION:  IUP at 28w0d   Normal level 2 ultrasound but suboptimal views of the 4 chamber heart and RVOT, --> views completed today.    Class III obesity complicating pregnancy  ADHD  Medication exposures  Recurrent palpitations and single episode of syncope  Anxiety in pregnancy    RECOMMENDATIONS:  Continue care with Dr. Max  Was advised to limit total weight gain to less than 20 pounds  Monthly growth ultrasounds starting at 28 weeks, add BPP to each growth ultrasound at 32 weeks and beyond  Weekly NSTs at 34 weeks  Delivery 39-40 weeks  Daily low-dose aspirin  I recommended she eat 15-30g carbs for breakfast.  30g carb mid morning  and mid-afternoon snack if hungry.  30 g carbs for lunch and dinner.  The bedtime snack, if hungry, should be 15g carbs with some protein.  Snack can be between 8-10 pm.    Recommend cardiology consultation for palpitations and single episode of syncope  Recommend behavioral cognitive therapy.    If indicated, SSRI can be used.  Keep neurology follow-up appointment.  Continue exercising      Total time spent 50 minutes this calendar day which includes preparing to see the patient including chart review, obtaining and/or reviewing additional medical history, performing a physical exam and evaluation, documenting clinical information in the electronic medical record, independently interpreting results, counseling the patient, communicating results to the patient/family/caregiver and coordinating care.     Case discussed with patient who demonstrated understanding and agreement with plan.     Thank you for allowing me to participate in the care of this patient.  Please feel free to contact me with any questions.    Maribel Cowan MD   Maternal-Fetal Medicine        Note to patient and family:  The 21st Century Cures Act makes medical notes available to patients in the interest of transparency.  However, please be advised that this is a medical document.  It is intended as a peer to peer communication.  It is written in medical language and may contain abbreviations or verbiage that are technical and unfamiliar.  It may appear blunt or direct.  Medical documents are intended to carry relevant information, facts as evident, and the clinical opinion of the practitioner.                         [1]    Current Outpatient Medications:   •  prenatal vitamin with DHA 27-0.8-228 MG Oral Cap, Take 1 capsule by mouth daily., Disp: , Rfl:   •  amphetamine-dextroamphetamine 30 MG Oral Tab, Take by mouth daily., Disp: , Rfl:   •  aspirin 81 MG Oral Tab EC, Take 2 tablets (162 mg total) by mouth daily., Disp: , Rfl:   [2]  Not on File